# Patient Record
Sex: FEMALE | Race: WHITE
[De-identification: names, ages, dates, MRNs, and addresses within clinical notes are randomized per-mention and may not be internally consistent; named-entity substitution may affect disease eponyms.]

---

## 2021-11-15 ENCOUNTER — HOSPITAL ENCOUNTER (OUTPATIENT)
Dept: HOSPITAL 35 - ULTRA | Age: 71
Discharge: HOME | End: 2021-11-15
Attending: OTOLARYNGOLOGY
Payer: COMMERCIAL

## 2021-11-15 DIAGNOSIS — E04.1: Primary | ICD-10-CM

## 2021-11-17 NOTE — PATH
[facility]
[address]
[place]   [address]                     PATHOLOGY RPT PROCEDURE       
_______________________________________________________________________________
 
Name:       MARLON GALEANO                Room #:                     [facility]#:      [id number]     Account #:      [id number]  
Admission:  11/15/21    Date of Birth:  [birth year]  
Discharge:                                              Report #:    [id number]
                                                        Path Case #: [id number]
_______________________________________________________________________________
Note
[facility] Accession Number: [id number]
   TESTS               RESULT  FLAG  UNITS    REF RANGE  LAB
------------------------------------------------------------
   Clinician Provided Cytology Information
   No. of containers..01 Other (Miscellaneous)
Source:                                                   01
   RIGHT THYROID NODULE
DIAGNOSIS:                                                02
   RIGHT THYROID NODULE
   NEGATIVE FOR MALIGNANT CELLS.
   BETHESDA CATEGORY II. SPECIMEN CONSISTS OF BENIGN FOLLICULAR CELLS,
   HEMOSIDERIN-LADEN MACROPHAGES, COLLOID, AND LYMPHOCYTES. THIS PATTERN IS
   CONSISTENT WITH A BENIGN FOLLICULAR NODULE.
   COLLOID IS PRESENT.
   RED BLOOD CELLS ARE PRESENT.
   THIS INTERPRETATION INCLUDES EVALUATION OF A CELL BLOCK.
   Comment:
   The presence of lymphocytes raises the possibility of chronic thyroiditis.
   The lymphocytes show focal apoptosis and degenerative changes.
Pathologist ICD10:                                        02
   E04.1
Signed out by:                                            Domonique Guevara MD, Pathologist
   NPI- [id number]
Performed by:                                             Yasmine Canchola, Cytotechnologist ([facility])
Gross description:                                        01
   30ML, CLEAR SLIGHTLY, BLOOD-TING
   /LCS  11/16/2021  1832 Local
 
------------------------------------------------------------
    FLAG LEGEND:
    L-Low Normal,H-High Normal,LL-Alert Low,HH-Alert High
    <-Panic Low,>-Panic High,A-Abnormal,AA-Critical Abnormal
------------------------------------------------------------
 
Performed at:
01 [facility]
   [address] [place] [address] [address]
   [place]  [address]
   Delmar Steele MD, Phone: [phone]
[address]  [address]
   Ginger Luna MD, Phone: [phone]
Specimen Comment: A courtesy copy of this report has been sent to [phone]
Specimen Comment: Report sent to 
***Performed at:  01
 
 
[address]   [address]                     PATHOLOGY RPT PROCEDURE       
_______________________________________________________________________________
 
Name:       MARLON GALEANO                Room #:                     REG [facility]#:      [id number]     Account #:      [id number]  
Admission:  11/15/21    Date of Birth:  [birth year]  
Discharge:                                              Report #:    [id number]
                                                        Path Case #: [id number]
_______________________________________________________________________________
   [facility]
   [address], [place], [place]  [id number]
   MD Delmar Steele MD Phone:  [phone]